# Patient Record
Sex: FEMALE | Race: WHITE
[De-identification: names, ages, dates, MRNs, and addresses within clinical notes are randomized per-mention and may not be internally consistent; named-entity substitution may affect disease eponyms.]

---

## 2017-04-05 ENCOUNTER — HOSPITAL ENCOUNTER (EMERGENCY)
Age: 37
Discharge: HOME | End: 2017-04-05
Payer: MEDICAID

## 2017-04-05 DIAGNOSIS — F17.200: ICD-10-CM
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
## 2017-04-05 DIAGNOSIS — S93.402A: Primary | ICD-10-CM
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
## 2018-05-25 ENCOUNTER — HOSPITAL ENCOUNTER (EMERGENCY)
Dept: HOSPITAL 76 - ED | Age: 38
Discharge: HOME | End: 2018-05-25
Payer: MEDICAID

## 2018-05-25 VITALS — SYSTOLIC BLOOD PRESSURE: 124 MMHG | DIASTOLIC BLOOD PRESSURE: 48 MMHG

## 2018-05-25 DIAGNOSIS — F17.200: ICD-10-CM

## 2018-05-25 DIAGNOSIS — R42: Primary | ICD-10-CM

## 2018-05-25 LAB
ALBUMIN DIAFP-MCNC: 4.2 G/DL (ref 3.2–5.5)
ALBUMIN/GLOB SERPL: 1.7 {RATIO} (ref 1–2.2)
ALP SERPL-CCNC: 36 IU/L (ref 42–121)
ALT SERPL W P-5'-P-CCNC: 17 IU/L (ref 10–60)
ANION GAP SERPL CALCULATED.4IONS-SCNC: 6 MMOL/L (ref 6–13)
AST SERPL W P-5'-P-CCNC: 18 IU/L (ref 10–42)
BASOPHILS NFR BLD AUTO: 0.1 10^3/UL (ref 0–0.1)
BASOPHILS NFR BLD AUTO: 1.2 %
BILIRUB BLD-MCNC: 0.9 MG/DL (ref 0.2–1)
BUN SERPL-MCNC: 13 MG/DL (ref 6–20)
CALCIUM UR-MCNC: 8.6 MG/DL (ref 8.5–10.3)
CHLORIDE SERPL-SCNC: 106 MMOL/L (ref 101–111)
CLARITY UR REFRACT.AUTO: CLEAR
CO2 SERPL-SCNC: 25 MMOL/L (ref 21–32)
CREAT SERPLBLD-SCNC: 0.7 MG/DL (ref 0.4–1)
EOSINOPHIL # BLD AUTO: 0.1 10^3/UL (ref 0–0.7)
EOSINOPHIL NFR BLD AUTO: 1.4 %
ERYTHROCYTE [DISTWIDTH] IN BLOOD BY AUTOMATED COUNT: 12.8 % (ref 12–15)
GFRSERPLBLD MDRD-ARVRAT: 94 ML/MIN/{1.73_M2} (ref 89–?)
GLOBULIN SER-MCNC: 2.5 G/DL (ref 2.1–4.2)
GLUCOSE SERPL-MCNC: 90 MG/DL (ref 70–100)
GLUCOSE UR QL STRIP.AUTO: NEGATIVE MG/DL
HCG UR QL: NEGATIVE
HGB UR QL STRIP: 13.5 G/DL (ref 12–16)
KETONES UR QL STRIP.AUTO: NEGATIVE MG/DL
LIPASE SERPL-CCNC: 18 U/L (ref 22–51)
LYMPHOCYTES # SPEC AUTO: 1.4 10^3/UL (ref 1.5–3.5)
LYMPHOCYTES NFR BLD AUTO: 26.8 %
MCH RBC QN AUTO: 31.3 PG (ref 27–31)
MCHC RBC AUTO-ENTMCNC: 34.8 G/DL (ref 32–36)
MCV RBC AUTO: 89.9 FL (ref 81–99)
MONOCYTES # BLD AUTO: 0.3 10^3/UL (ref 0–1)
MONOCYTES NFR BLD AUTO: 6.2 %
NEUTROPHILS # BLD AUTO: 3.3 10^3/UL (ref 1.5–6.6)
NEUTROPHILS # SNV AUTO: 5.2 X10^3/UL (ref 4.8–10.8)
NEUTROPHILS NFR BLD AUTO: 64.4 %
NITRITE UR QL STRIP.AUTO: NEGATIVE
PDW BLD AUTO: 6.8 FL (ref 7.9–10.8)
PH UR STRIP.AUTO: 7 PH (ref 5–7.5)
PLATELET # BLD: 237 10^3/UL (ref 130–450)
PROT SPEC-MCNC: 6.7 G/DL (ref 6.7–8.2)
PROT UR STRIP.AUTO-MCNC: NEGATIVE MG/DL
RBC # UR STRIP.AUTO: (no result) /UL
RBC # URNS HPF: (no result) /HPF (ref 0–5)
RBC MAR: 4.32 10^6/UL (ref 4.2–5.4)
SODIUM SERPLBLD-SCNC: 137 MMOL/L (ref 135–145)
SP GR UR STRIP.AUTO: <=1.005 (ref 1–1.03)
SQUAMOUS URNS QL MICRO: (no result)
UROBILINOGEN UR QL STRIP.AUTO: (no result) E.U./DL
UROBILINOGEN UR STRIP.AUTO-MCNC: NEGATIVE MG/DL

## 2018-05-25 PROCEDURE — 70450 CT HEAD/BRAIN W/O DYE: CPT

## 2018-05-25 PROCEDURE — 36415 COLL VENOUS BLD VENIPUNCTURE: CPT

## 2018-05-25 PROCEDURE — 81003 URINALYSIS AUTO W/O SCOPE: CPT

## 2018-05-25 PROCEDURE — 81001 URINALYSIS AUTO W/SCOPE: CPT

## 2018-05-25 PROCEDURE — 93005 ELECTROCARDIOGRAM TRACING: CPT

## 2018-05-25 PROCEDURE — 99283 EMERGENCY DEPT VISIT LOW MDM: CPT

## 2018-05-25 PROCEDURE — 99284 EMERGENCY DEPT VISIT MOD MDM: CPT

## 2018-05-25 PROCEDURE — 81025 URINE PREGNANCY TEST: CPT

## 2018-05-25 PROCEDURE — 85025 COMPLETE CBC W/AUTO DIFF WBC: CPT

## 2018-05-25 PROCEDURE — 80053 COMPREHEN METABOLIC PANEL: CPT

## 2018-05-25 PROCEDURE — 83690 ASSAY OF LIPASE: CPT

## 2018-05-25 PROCEDURE — 87086 URINE CULTURE/COLONY COUNT: CPT

## 2018-05-25 NOTE — CT REPORT
EXAM:

CT HEAD

 

EXAM DATE: 5/25/2018 10:07 AM.

 

CLINICAL HISTORY: Dizziness and giddiness.

 

COMPARISON: None.

 

TECHNIQUE: Multiaxial CT images were obtained from the foramen magnum to the vertex. Reformats: Coron
al. IV contrast: None.

 

In accordance with CT protocol optimization, one or more of the following dose reduction techniques w
ere utilized for this exam: automated exposure control, adjustment of mA and/or KV based on patient s
ize, or use of iterative reconstructive technique.

 

FINDINGS:

Parenchyma: No intraparenchymal hemorrhage. No evidence of mass, midline shift, or CT findings of inf
arction. Gray-white differentiation is distinct. 

 

Extraaxial Spaces: Normal for age. No subdural or epidural collections identified.

 

Ventricles: Normal in size and position.

 

Sinuses and Orbits: Imaged paranasal sinuses, orbits, and mastoids show no significant abnormality.

 

Bones: No evidence of fracture or calvarial defect.

 

Other: None.

 

IMPRESSION: Normal head CT.

 

[facility]

Referring Provider Line: [phone]

 

SITE ID: [id number]

## 2018-05-25 NOTE — ED PHYSICIAN DOCUMENTATION
History of Present Illness





- Stated complaint


Stated Complaint: DIZZINESS





- Chief complaint


Chief Complaint: Neuro





- History obtained from


History obtained from: Patient





- History of Present Illness


Timing: Yesterday





- Additonal information


Additional information: 


The patient is a 37-year-old female who presents with "dizziness," which 

started yesterday with a headache and nausea.  The dizziness is mostly a 

feeling of being off balance and her mind feels spacey.  She denies vertigo or 

lightheadedness.  She denies visual disturbance, fever, numbness or weakness.  

Although she had headache and nausea at onset yesterday, she denies headache or 

nausea today.  She denies any recent traumatic injury.  She has recently had 

sexual intercourse with her ex-, and is concerned about the possibility 

of an STD causing her symptoms.








Review of Systems


Constitutional: reports: Other ("Dizziness").  denies: Fever


Eyes: denies: Decreased vision, Photophobia


Ears: denies: Tinnitus/ringing


Nose: denies: Congestion


Throat: denies: Sore throat


Cardiac: denies: Chest pain / pressure


Respiratory: denies: Dyspnea, Cough


GI: reports: Nausea (Yesterday, but not today.).  denies: Abdominal Pain, 

Vomiting, Diarrhea


: denies: Dysuria


Skin: denies: Rash


Musculoskeletal: denies: Neck pain, Extremity pain


Neurologic: reports: Headache (Yesterday, but not today.).  denies: Focal 

weakness, Numbness





PD PAST MEDICAL HISTORY





- Past Medical History


Cardiovascular: None


Neuro: None


Endocrine/Autoimmune: None





- Past Surgical History


Past Surgical History: No





- Present Medications


Home Medications: 


 Ambulatory Orders











 Medication  Instructions  Recorded  Confirmed


 


Meclizine [Antivert] 25 mg PO Q6H #20 tablet 05/25/18 














- Allergies


Allergies/Adverse Reactions: 


 Allergies











Allergy/AdvReac Type Severity Reaction Status Date / Time


 


Sulfa (Sulfonamide Allergy  Unknown Verified 05/25/18 08:52





Antibiotics)     














- Social History


Does the pt smoke?: Yes


Smoking Status: Current every day smoker


Does the pt drink ETOH?: Yes


Does the pt have substance abuse?: Yes





PD ED PE NORMAL





- Vitals


Vital signs reviewed: Yes (normal)





- General


General: Alert and oriented X 3, Well developed/nourished





- HEENT


HEENT: Atraumatic, PERRL, EOMI, Ears normal, Moist mucous membranes, Pharynx 

benign





- Neck


Neck: Supple, no meningeal sign, No adenopathy, No JVD





- Cardiac


Cardiac: RRR, No murmur





- Respiratory


Respiratory: No respiratory distress, Clear bilaterally





- Abdomen


Abdomen: Soft, Non tender





- Back


Back: No CVA TTP





- Derm


Derm: No rash





- Extremities


Extremities: No edema, No calf tenderness / cord





- Neuro


Neuro: Alert and oriented X 3, No motor deficit, No sensory deficit, Normal 

speech


Eye Opening: Spontaneous


Motor: Obeys Commands


Verbal: Oriented


GCS Score: 15





Results





- Vitals


Vitals: 


 Oxygen











O2 Source                      Room air

















- EKG (time done)


  ** 08:47


Rate: Rate (enter#) (77)


Rhythm: NSR


Axis: Normal


Intervals: Normal IA


QRS: Normal


Ischemia: Normal ST segments


Computer interpretation: Agree with computer





- Labs


Labs: 


 Laboratory Tests











  05/25/18 05/25/18 05/25/18





  09:55 09:55 10:03


 


WBC  5.2  


 


RBC  4.32  


 


Hgb  13.5  


 


Hct  38.8  


 


MCV  89.9  


 


MCH  31.3 H  


 


MCHC  34.8  


 


RDW  12.8  


 


Plt Count  237  


 


MPV  6.8 L  


 


Neut #  3.3  


 


Lymph #  1.4 L  


 


Mono #  0.3  


 


Eos #  0.1  


 


Baso #  0.1  


 


Absolute Nucleated RBC  0.00  


 


Nucleated RBC %  0.0  


 


Sodium   137 


 


Potassium   3.7 


 


Chloride   106 


 


Carbon Dioxide   25 


 


Anion Gap   6.0 


 


BUN   13 


 


Creatinine   0.7 


 


Estimated GFR (MDRD)   94 


 


Glucose   90 


 


Calcium   8.6 


 


Total Bilirubin   0.9 


 


AST   18 


 


ALT   17 


 


Alkaline Phosphatase   36 L 


 


Total Protein   6.7 


 


Albumin   4.2 


 


Globulin   2.5 


 


Albumin/Globulin Ratio   1.7 


 


Lipase   18 L 


 


Urine Color    LIGHT YELLOW


 


Urine Clarity    CLEAR


 


Urine pH    7.0


 


Ur Specific Gravity    <=1.005


 


Urine Protein    NEGATIVE


 


Urine Glucose (UA)    NEGATIVE


 


Urine Ketones    NEGATIVE


 


Urine Occult Blood    MODERATE H


 


Urine Nitrite    NEGATIVE


 


Urine Bilirubin    NEGATIVE


 


Urine Urobilinogen    0.2 (NORMAL)


 


Ur Leukocyte Esterase    NEGATIVE


 


Urine RBC    0-5


 


Urine WBC    0-3


 


Ur Squamous Epith Cells    NONE SEEN


 


Urine Bacteria    Rare


 


Ur Microscopic Review    INDICATED


 


Urine Culture Comments    NOT INDICATED


 


Urine HCG, Qual    NEGATIVE














- Rads (name of study)


  ** Head CT


Radiology: Prelim report reviewed, EMP read contemporaneously, See rad report (

Normal head CT.)





PD MEDICAL DECISION MAKING





- ED course


Complexity details: reviewed results, re-evaluated patient, considered 

differential, d/w patient


ED course: 





The underlying cause of the patient's dizziness is uncertain.  Her symptoms may 

be associated with complicated migraine.  Vertigo is also a consideration.  

Given the description of her onset of headache, intracranial hemorrhage was 

considered.  However ICH is unlikely with a completely normal head CT, and 

complete resolution of her headache and nausea.  Her presentation does not 

suggest meningitis, temporal arteritis, or pseudotumor cerebri.


Treatment in the emergency department included administration of meclizine 25 

mg orally.  At the time of discharge she felt subjectively improved.  She is 

being discharged with prescription for meclizine.  I discussed with her the 

results of her workup, symptomatic treatment and outpatient follow-up, as well 

as potentially worrisome signs or symptoms that should prompt reevaluation in 

the emergency department.





Departure





- Departure


Disposition: 01 Home, Self Care


Clinical Impression: 


 Dizziness





Condition: Stable


Instructions:  ED Dizziness UKO


Follow-Up: 


Uma Castellanos NP [Physician No Access] - 


Prescriptions: 


Meclizine [Antivert] 25 mg PO Q6H #20 tablet


Comments: 


You can use meclizine as prescribed if needed for recurrent dizziness.  


Drink plenty of fluids.


Follow up with your primary physician within 1 week.  Call to schedule 

appointment.


Return to the emergency department if you develop increasing dizziness, 

increasing headache, persistent vomiting, or otherwise worsening symptom


Discharge Date/Time: 05/25/18 10:46

## 2018-05-25 NOTE — CT PRELIMINARY REPORT
Accession: [id number]

Exam: CT HEAD W/O

 

IMPRESSION: Normal head CT.

 

[facility]

 

SITE ID: [id number]

## 2019-12-13 ENCOUNTER — HOSPITAL ENCOUNTER (OUTPATIENT)
Dept: HOSPITAL 76 - LAB | Age: 39
Discharge: HOME | End: 2019-12-13
Attending: NURSE PRACTITIONER
Payer: MEDICARE

## 2019-12-13 DIAGNOSIS — F41.9: ICD-10-CM

## 2019-12-13 DIAGNOSIS — F32.9: Primary | ICD-10-CM

## 2019-12-13 LAB
ALBUMIN DIAFP-MCNC: 4.9 G/DL (ref 3.2–5.5)
ALBUMIN/GLOB SERPL: 1.9 {RATIO} (ref 1–2.2)
ALP SERPL-CCNC: 39 IU/L (ref 42–121)
ALT SERPL W P-5'-P-CCNC: 24 IU/L (ref 10–60)
ANION GAP SERPL CALCULATED.4IONS-SCNC: 6 MMOL/L (ref 6–13)
AST SERPL W P-5'-P-CCNC: 21 IU/L (ref 10–42)
BASOPHILS NFR BLD AUTO: 0.1 10^3/UL (ref 0–0.1)
BASOPHILS NFR BLD AUTO: 1.1 %
BILIRUB BLD-MCNC: 0.9 MG/DL (ref 0.2–1)
BUN SERPL-MCNC: 17 MG/DL (ref 6–20)
CALCIUM UR-MCNC: 9.2 MG/DL (ref 8.5–10.3)
CHLORIDE SERPL-SCNC: 106 MMOL/L (ref 101–111)
CO2 SERPL-SCNC: 27 MMOL/L (ref 21–32)
CREAT SERPLBLD-SCNC: 0.7 MG/DL (ref 0.4–1)
EOSINOPHIL # BLD AUTO: 0.2 10^3/UL (ref 0–0.7)
EOSINOPHIL NFR BLD AUTO: 2.7 %
ERYTHROCYTE [DISTWIDTH] IN BLOOD BY AUTOMATED COUNT: 11.9 % (ref 12–15)
GFRSERPLBLD MDRD-ARVRAT: 93 ML/MIN/{1.73_M2} (ref 89–?)
GLOBULIN SER-MCNC: 2.6 G/DL (ref 2.1–4.2)
GLUCOSE SERPL-MCNC: 90 MG/DL (ref 70–100)
HGB UR QL STRIP: 14.5 G/DL (ref 12–16)
LYMPHOCYTES # SPEC AUTO: 2.1 10^3/UL (ref 1.5–3.5)
LYMPHOCYTES NFR BLD AUTO: 33.7 %
MCH RBC QN AUTO: 31.5 PG (ref 27–31)
MCHC RBC AUTO-ENTMCNC: 34.3 G/DL (ref 32–36)
MCV RBC AUTO: 92 FL (ref 81–99)
MONOCYTES # BLD AUTO: 0.4 10^3/UL (ref 0–1)
MONOCYTES NFR BLD AUTO: 6.9 %
NEUTROPHILS # BLD AUTO: 3.4 10^3/UL (ref 1.5–6.6)
NEUTROPHILS # SNV AUTO: 6.2 X10^3/UL (ref 4.8–10.8)
NEUTROPHILS NFR BLD AUTO: 55.3 %
PDW BLD AUTO: 8.7 FL (ref 7.9–10.8)
PLATELET # BLD: 267 10^3/UL (ref 130–450)
PROT SPEC-MCNC: 7.5 G/DL (ref 6.7–8.2)
RBC MAR: 4.6 10^6/UL (ref 4.2–5.4)
SODIUM SERPLBLD-SCNC: 139 MMOL/L (ref 135–145)

## 2019-12-13 PROCEDURE — 85025 COMPLETE CBC W/AUTO DIFF WBC: CPT

## 2019-12-13 PROCEDURE — 84443 ASSAY THYROID STIM HORMONE: CPT

## 2019-12-13 PROCEDURE — 36415 COLL VENOUS BLD VENIPUNCTURE: CPT

## 2019-12-13 PROCEDURE — 80053 COMPREHEN METABOLIC PANEL: CPT

## 2020-05-21 ENCOUNTER — HOSPITAL ENCOUNTER (OUTPATIENT)
Dept: HOSPITAL 76 - LAB.R | Age: 40
Discharge: HOME | End: 2020-05-21
Attending: OBSTETRICS & GYNECOLOGY
Payer: MEDICARE

## 2020-05-21 DIAGNOSIS — Z11.3: Primary | ICD-10-CM

## 2020-05-21 LAB
C KRUSEI DNA VAG QL NAA+PROBE: NEGATIVE
CANDIDA DNA VAG QL NAA+PROBE: NEGATIVE
T VAGINALIS RRNA GENITAL QL PROBE: NEGATIVE

## 2020-05-21 PROCEDURE — 87801 DETECT AGNT MULT DNA AMPLI: CPT

## 2020-05-21 PROCEDURE — 87661 TRICHOMONAS VAGINALIS AMPLIF: CPT

## 2021-04-09 ENCOUNTER — HOSPITAL ENCOUNTER (OUTPATIENT)
Dept: HOSPITAL 76 - DI.N | Age: 41
Discharge: HOME | End: 2021-04-09
Attending: NURSE PRACTITIONER
Payer: MEDICAID

## 2021-04-09 DIAGNOSIS — R07.89: Primary | ICD-10-CM

## 2021-04-09 NOTE — XRAY REPORT
PROCEDURE:  Chest 2 View X-Ray

 

INDICATIONS:  CHEST DISCOMFORT, ATYPICAL

 

TECHNIQUE:  2 view(s) of the chest.  

 

COMPARISON:  None.

 

FINDINGS:  

 

Surgical changes and devices:  None.  

 

Lungs and pleura:  No pleural effusions or pneumothorax.  Lungs are clear.  

 

Mediastinum:  Mediastinal contours are normal.  Heart size is normal.  

 

Bones and chest wall:  No suspicious bony abnormalities.  Soft tissues appear unremarkable.  

 

IMPRESSION:  No acute process.

 

Reviewed by: Rocio Sibley MD on 4/9/2021 4:27 PM PDT

Approved by: Rocio Sibley MD on 4/9/2021 4:27 PM PDT

 

 

Station ID:  [id number]

## 2021-12-10 ENCOUNTER — HOSPITAL ENCOUNTER (EMERGENCY)
Dept: HOSPITAL 76 - ED | Age: 41
Discharge: HOME | End: 2021-12-10
Payer: MEDICAID

## 2021-12-10 VITALS — SYSTOLIC BLOOD PRESSURE: 106 MMHG | DIASTOLIC BLOOD PRESSURE: 75 MMHG

## 2021-12-10 DIAGNOSIS — M54.2: Primary | ICD-10-CM

## 2021-12-10 DIAGNOSIS — F17.200: ICD-10-CM

## 2021-12-10 PROCEDURE — 96374 THER/PROPH/DIAG INJ IV PUSH: CPT

## 2021-12-10 PROCEDURE — 99282 EMERGENCY DEPT VISIT SF MDM: CPT

## 2021-12-10 PROCEDURE — 70496 CT ANGIOGRAPHY HEAD: CPT

## 2021-12-10 PROCEDURE — 70498 CT ANGIOGRAPHY NECK: CPT

## 2021-12-10 PROCEDURE — 99284 EMERGENCY DEPT VISIT MOD MDM: CPT

## 2021-12-10 NOTE — ED PHYSICIAN DOCUMENTATION
PD HPI NECK PAIN





- Stated complaint


Stated Complaint: NECK PX





- Chief complaint


Chief Complaint: Trauma Hd/Nk





- History obtained from


History obtained from: Patient





- Additional information


Additional information: 





Healthy 41-year-old woman developed atraumatic gradual onset left-sided 

posterior neck pain about 3 days ago.  It came on over about 12 hours.  It is 

now severe and associated with nausea and feeling a little off balance.  This is

never happened to her before.  There was no trauma.  She had some very brief 

episodes of tingling in the left arm which are resolved.  No other weakness or n

umbness.





Review of Systems


Constitutional: denies: Fever, Chills


Ears: reports: Reviewed and negative


Nose: reports: Reviewed and negative


Throat: reports: Reviewed and negative


Cardiac: reports: Reviewed and negative


Respiratory: reports: Reviewed and negative





PD PAST MEDICAL HISTORY





- Past Medical History


Cardiovascular: None


Neuro: None


Endocrine/Autoimmune: None





- Past Surgical History


Past Surgical History: No





- Present Medications


Home Medications: 


                                Ambulatory Orders











 Medication  Instructions  Recorded  Confirmed


 


Meclizine [Antivert] 25 mg PO Q6H #20 tablet 05/25/18 


 


Lidocaine Patch 5% [Lidoderm Patch] 1 patch TOP DAILY PRN #10 patch 12/10/21 














- Allergies


Allergies/Adverse Reactions: 


                                    Allergies











Allergy/AdvReac Type Severity Reaction Status Date / Time


 


Sulfa (Sulfonamide Allergy  Unknown Verified 12/10/21 12:07





Antibiotics)     














- Social History


Does the pt smoke?: Yes


Smoking Status: Current every day smoker


Does the pt drink ETOH?: Yes


Does the pt have substance abuse?: Yes





PD ED PE NORMAL





- Vitals


Vital signs reviewed: Yes





- General


General: Alert and oriented X 3, No acute distress





- HEENT


HEENT: PERRL, EOMI





- Neck


Neck: Supple, no meningeal sign, Other (Tender over the left posterior neck, no 

midline spinal tenderness.  Equal upper extremity reflexes, sensation,  

strength, thumb extension, interosseous strength.)





- Extremities


Extremities: No edema, No calf tenderness / cord





- Neuro


Neuro: Alert and oriented X 3, CNs 2-12 intact, Normal speech


Eye Opening: Spontaneous


Motor: Obeys Commands


Verbal: Oriented


GCS Score: 15





- Psych


Psych: Normal mood, Normal affect





Results





- Vitals


Vitals: 


                               Vital Signs - 24 hr











  12/10/21 12/10/21





  12:04 14:08


 


Temperature 37.1 C 36.6 C


 


Heart Rate 75 69


 


Respiratory 16 16





Rate  


 


Blood Pressure 115/78 106/75


 


O2 Saturation 100 98








                                     Oxygen











O2 Source                      Room air

















- Rads (name of study)


  ** CT angiography of the head and neck are unremarkable


Radiology: Final report received, EMP read contemporaneously





PD MEDICAL DECISION MAKING





- ED course


ED course: 





She presents with pain that is likely sternocleidomastoid spasm but the 

association with disequilibrium and nausea is concerning, and the differential 

also includes spontaneous vertebral dissection or radiculopathy, although no 

signs of that currently.  Will assess with CT angio head and neck for the 

dissection component.





Departure





- Departure


Disposition: 01 Home, Self Care


Clinical Impression: 


 Neck pain





Condition: Good


Record reviewed to determine appropriate education?: Yes


Instructions:  ED Neck Pain No Trauma


Prescriptions: 


Lidocaine Patch 5% [Lidoderm Patch] 1 patch TOP DAILY PRN #10 patch


 PRN Reason: pain


Comments: 


As discussed, CT angiography of the head and neck is unremarkable, that is good,

I was a bit worried about a vertebral artery dissection but that is now ruled 

out.  Return for new or worsening symptoms.  You can take 600 mg / 4 tablets of 

200 mg ibuprofen every 6 hours as needed for pain.  Return for new or worsening 

symptoms.  If not improving, follow-up with your doctor next week for 

reevaluation, potential physical therapy referral.


Discharge Date/Time: 12/10/21 14:10

## 2021-12-10 NOTE — CT REPORT
PROCEDURE:  ANGIO HEAD W/WO

 

INDICATIONS:  neck pain p dizzyness

 

CONTRAST:  IV CONTRAST: Isovue 300 ml: 80 PO CONTRAST: *NO PO CONTRAST 

 

TECHNIQUE:  

Precontrast 4.5 mm thick angled axial sections acquired from the foramen magnum to the vertex.   Afte
r the administration of intravenous contrast, 1 mm thick sections acquired through the [place] of [place]
is.  Postcontrast 4.5 mm thick sections then re-acquired from the foramen magnum to the vertex.  3-di
mensional maximum-intensity-projection (MIP) and/or volume rendering reformats were acquired of the c
entral intracranial vasculature.  For radiation dose reduction, the following was used:  automated ex
posure control, adjustment of mA and/or kV according to patient size.

 

COMPARISON:  CT head 5/25/2018, CTA neck 12/10/2021

 

FINDINGS:  

Image quality:  Excellent.  

 

Anterior circulation:  Intracranial internal carotid arteries are normal in size and flow.  The flow 
within the paired anterior cerebral arteries is normal and symmetric.  The flow within the middle cer
ebral arteries is normal and symmetric.  The anterior communicating artery is seen.  No aneurysms are
 seen.  

 

Posterior circulation:  Visualized portions of the vertebral arteries demonstrate normal caliber, and
 join to form a normal appearing basilar artery.  Flow within the posterior cerebral arteries is norm
al and symmetric.  No aneurysms are seen.  There is a vertebral artery dominance.

 

CSF spaces:  Ventricles are normal in size and shape.  Basal cisterns are patent.  No extra-axial flu
id collections.  

 

Brain:  No midline shift.  No intracranial bleeds or masses.  Gray-white matter interface appears int
act.  

 

Skull and face:  Calvarium and facial bones appear intact, without suspicious lesions.  

 

Sinuses:  Visualized sinuses and mastoids are clear.  

 

IMPRESSION:  

1.  No acute intracranial process.

 

2. No areas of hemodynamically significant stenosis, vascular occlusion or aneurysmal dilation within
 the anterior circulation.

 

3. No areas of hemodynamically significant stenosis, vascular occlusion or aneurysmal dilation within
 the posterior circulation.

 

Reviewed by: Tika Bartholomew MD on 12/10/2021 1:52 PM PST

Approved by: Tika Bartholomew MD on 12/10/2021 1:52 PM PST

 

 

Station ID:  [id number]

## 2021-12-10 NOTE — CT REPORT
PROCEDURE:  ANGIO NECK W

 

INDICATIONS:  neck pain p dizzyness

 

CONTRAST:  IV CONTRAST: Isovue 300 ml: 80 PO CONTRAST: *NO PO CONTRAST 

 

TECHNIQUE:  

After the administration of intravenous contrast, 1.5 mm axial sections acquired from the aortic arch
 to the [place] of [place].  Coronal 3-D maximum intensity projection (MIP) and/or volume rendering ref
ormats were then performed.  For radiation dose reduction, the following was used:  automated exposur
e control, adjustment of mA and/or kV according to patient size.

 

COMPARISON:  CT head 5/5/2018, CTA head 12/10/2021

 

FINDINGS:  

Image quality:  Excellent.  

 

Carotid system:  The great vessels demonstrate a conventional anatomy as they arise from the aortic a
rch.  The origins of the common carotid arteries appear patent.  The common carotid arteries demonstr
ate normal calibers and courses.  The bifurcation regions appear normal bilaterally.  The internal ca
rotid arteries demonstrate normal caliber and course.  

 

Posterior circulation:  The origins of the vertebral arteries appear patent.  The more superior porti
ons of the vertebral arteries demonstrate normal course and caliber.  They join to form a normal appe
aring basilar artery.  

 

Soft tissues:  Visualized neck soft tissues demonstrate no suspicious abnormalities.  The thyroid is 
normal in size and there are no incidental findings.

 

Bones:  No suspicious bony lesions.  Visualized cervical spine appears normally aligned.   

 

IMPRESSION:  

There are no areas of hemodynamically significant stenosis, vascular occlusion or aneurysmal dilation
 within the neck vasculature.

 

The estimate of stenosis included in the report of the imaging study was calculated using the NASCET 
method

 

 

 

CLINICAL RECOMMENDATION STATEMENTS:

In patients <35 years with an ITN detected on CT, MRI, or extrathyroidal ultrasound, the Committee re
commends further evaluation with dedicated thyroid ultrasound if the nodule is "e1 cm and has no susp
icious imaging features, and if the patient has normal life expectancy.

In patients "e35 years with an ITN detected on CT, MRI, or extrathyroidal ultrasound, the Committee r
ecommends further evaluation with dedicated thyroid ultrasound if the nodule is "e1.5 cm and has no s
uspicious imaging features, and if the patient has normal life expectancy. (ACR, 2014)

 

Reviewed by: Tika Bartholomew MD on 12/10/2021 1:53 PM PST

Approved by: Tika Bartholomew MD on 12/10/2021 1:53 PM PST

 

 

Station ID:  [id number]